# Patient Record
Sex: MALE | Race: WHITE | NOT HISPANIC OR LATINO | Employment: OTHER | URBAN - METROPOLITAN AREA
[De-identification: names, ages, dates, MRNs, and addresses within clinical notes are randomized per-mention and may not be internally consistent; named-entity substitution may affect disease eponyms.]

---

## 2017-12-04 ENCOUNTER — ALLSCRIPTS OFFICE VISIT (OUTPATIENT)
Dept: OTHER | Facility: OTHER | Age: 72
End: 2017-12-04

## 2017-12-04 LAB
BILIRUB UR QL STRIP: NORMAL
CLARITY UR: NORMAL
COLOR UR: YELLOW
GLUCOSE (HISTORICAL): NORMAL
HGB UR QL STRIP.AUTO: NORMAL
KETONES UR STRIP-MCNC: NORMAL MG/DL
LEUKOCYTE ESTERASE UR QL STRIP: NORMAL
NITRITE UR QL STRIP: NORMAL
PH UR STRIP.AUTO: 6 [PH]
PROT UR STRIP-MCNC: NORMAL MG/DL
SP GR UR STRIP.AUTO: 1.02
UROBILINOGEN UR QL STRIP.AUTO: 0.2

## 2017-12-05 DIAGNOSIS — N40.1 ENLARGED PROSTATE WITH LOWER URINARY TRACT SYMPTOMS (LUTS): ICD-10-CM

## 2018-01-22 VITALS
BODY MASS INDEX: 32.87 KG/M2 | DIASTOLIC BLOOD PRESSURE: 80 MMHG | WEIGHT: 248 LBS | HEIGHT: 73 IN | SYSTOLIC BLOOD PRESSURE: 132 MMHG

## 2018-12-19 RX ORDER — WARFARIN SODIUM 5 MG/1
TABLET ORAL
COMMUNITY

## 2018-12-19 RX ORDER — VALSARTAN 80 MG/1
1 TABLET ORAL DAILY
COMMUNITY
Start: 2016-06-21

## 2018-12-19 RX ORDER — AMLODIPINE BESYLATE 5 MG/1
TABLET ORAL
COMMUNITY
Start: 2018-10-10

## 2018-12-19 RX ORDER — METOPROLOL SUCCINATE 25 MG/1
TABLET, EXTENDED RELEASE ORAL
COMMUNITY
Start: 2018-12-10

## 2018-12-19 RX ORDER — PIRFENIDONE 801 MG/1
801 TABLET, FILM COATED ORAL
COMMUNITY
Start: 2018-09-24

## 2018-12-19 RX ORDER — AMOXICILLIN 500 MG/1
CAPSULE ORAL
COMMUNITY
Start: 2017-12-11

## 2018-12-19 RX ORDER — OMEGA-3 FATTY ACIDS CAP DELAYED RELEASE 1000 MG 1000 MG
2 CAPSULE DELAYED RELEASE ORAL DAILY
COMMUNITY

## 2018-12-19 RX ORDER — CLOBETASOL PROPIONATE 0.5 MG/G
OINTMENT TOPICAL
COMMUNITY
Start: 2017-07-25

## 2018-12-19 RX ORDER — FLUTICASONE PROPIONATE 50 MCG
1 SPRAY, SUSPENSION (ML) NASAL
COMMUNITY
Start: 2016-06-21

## 2018-12-19 RX ORDER — ROSUVASTATIN CALCIUM 10 MG/1
10 TABLET, COATED ORAL
COMMUNITY
Start: 2018-11-02 | End: 2020-04-06

## 2018-12-24 ENCOUNTER — OFFICE VISIT (OUTPATIENT)
Dept: UROLOGY | Facility: MEDICAL CENTER | Age: 73
End: 2018-12-24
Payer: MEDICARE

## 2018-12-24 VITALS
HEIGHT: 73 IN | HEART RATE: 90 BPM | DIASTOLIC BLOOD PRESSURE: 80 MMHG | BODY MASS INDEX: 32.47 KG/M2 | SYSTOLIC BLOOD PRESSURE: 140 MMHG | WEIGHT: 245 LBS

## 2018-12-24 DIAGNOSIS — N13.8 BPH WITH OBSTRUCTION/LOWER URINARY TRACT SYMPTOMS: Primary | ICD-10-CM

## 2018-12-24 DIAGNOSIS — N40.1 BPH WITH OBSTRUCTION/LOWER URINARY TRACT SYMPTOMS: Primary | ICD-10-CM

## 2018-12-24 DIAGNOSIS — R31.29 OTHER MICROSCOPIC HEMATURIA: ICD-10-CM

## 2018-12-24 PROBLEM — I45.10 RBBB: Status: ACTIVE | Noted: 2017-10-17

## 2018-12-24 PROBLEM — Z79.01 LONG TERM (CURRENT) USE OF ANTICOAGULANTS: Status: ACTIVE | Noted: 2017-01-27

## 2018-12-24 PROBLEM — R06.02 SOB (SHORTNESS OF BREATH): Status: ACTIVE | Noted: 2017-01-11

## 2018-12-24 LAB
BACTERIA UR QL AUTO: ABNORMAL /HPF
BILIRUB UR QL STRIP: NEGATIVE
CLARITY UR: CLEAR
COLOR UR: ABNORMAL
GLUCOSE UR STRIP-MCNC: NEGATIVE MG/DL
HGB UR QL STRIP.AUTO: ABNORMAL
HYALINE CASTS #/AREA URNS LPF: ABNORMAL /LPF
KETONES UR STRIP-MCNC: NEGATIVE MG/DL
LEUKOCYTE ESTERASE UR QL STRIP: NEGATIVE
NITRITE UR QL STRIP: NEGATIVE
NON-SQ EPI CELLS URNS QL MICRO: ABNORMAL /HPF
PH UR STRIP.AUTO: 5.5 [PH] (ref 4.5–8)
PROT UR STRIP-MCNC: NEGATIVE MG/DL
RBC #/AREA URNS AUTO: ABNORMAL /HPF
SL AMB  POCT GLUCOSE, UA: ABNORMAL
SL AMB LEUKOCYTE ESTERASE,UA: ABNORMAL
SL AMB POCT BILIRUBIN,UA: ABNORMAL
SL AMB POCT BLOOD,UA: ABNORMAL
SL AMB POCT CLARITY,UA: CLEAR
SL AMB POCT COLOR,UA: YELLOW
SL AMB POCT KETONES,UA: ABNORMAL
SL AMB POCT NITRITE,UA: ABNORMAL
SL AMB POCT PH,UA: 6
SL AMB POCT SPECIFIC GRAVITY,UA: 1.02
SL AMB POCT URINE PROTEIN: ABNORMAL
SL AMB POCT UROBILINOGEN: 0.2
SP GR UR STRIP.AUTO: 1.03 (ref 1–1.03)
UROBILINOGEN UR QL STRIP.AUTO: 0.2 E.U./DL
WBC #/AREA URNS AUTO: ABNORMAL /HPF

## 2018-12-24 PROCEDURE — 99214 OFFICE O/P EST MOD 30 MIN: CPT | Performed by: UROLOGY

## 2018-12-24 PROCEDURE — 81003 URINALYSIS AUTO W/O SCOPE: CPT | Performed by: UROLOGY

## 2018-12-24 PROCEDURE — 81001 URINALYSIS AUTO W/SCOPE: CPT | Performed by: UROLOGY

## 2018-12-24 NOTE — ASSESSMENT & PLAN NOTE
AUA symptom score is 1  He is pleased with his voiding pattern  We will continue to follow with watchful waiting  PSA last year was 1 01  The pros and cons of PSA testing were discussed

## 2018-12-24 NOTE — ASSESSMENT & PLAN NOTE
This is chronic and likely related to his use of anticoagulation  His renal function is normal   We will send off urinalysis for microscopic analysis to assess whether this is of any significance

## 2018-12-24 NOTE — PROGRESS NOTES
Assessment/Plan:    BPH with obstruction/lower urinary tract symptoms  AUA symptom score is 1  He is pleased with his voiding pattern  We will continue to follow with watchful waiting  PSA last year was 1 01  The pros and cons of PSA testing were discussed  Other microscopic hematuria  This is chronic and likely related to his use of anticoagulation  We will send off urinalysis for microscopic analysis to assess whether this is of any significance  Diagnoses and all orders for this visit:    BPH with obstruction/lower urinary tract symptoms  -     POCT urine dip auto non-scope    Other microscopic hematuria  -     Urinalysis with microscopic    Other orders  -     amLODIPine (NORVASC) 5 mg tablet; TAKE ONE TABLET BY MOUTH EVERY DAY  -     amoxicillin (AMOXIL) 500 mg capsule; 4 caps one hour  prior to dental procedures  -     mometasone (ASMANEX 120 METERED DOSES) 220 MCG/INH inhaler; Inhale 2 puffs 2 (two) times a day  -     clobetasol (TEMOVATE) 0 05 % ointment; apply prn for flares  -     ipratropium-albuterol (COMBIVENT RESPIMAT) inhaler; Inhale 1 puff 4 (four) times a day  -     warfarin (COUMADIN) 5 mg tablet; Take by mouth  -     Pirfenidone (ESBRIET) 267 MG CAPS; Take 3 capsules by mouth daily  -     Omega-3 Fatty Acids (FISH OIL) 1000 MG CPDR; Take 2 capsules by mouth daily  -     fluticasone (FLONASE) 50 mcg/act nasal spray; 1 spray into each nostril  -     metoprolol succinate (TOPROL-XL) 25 mg 24 hr tablet; TAKE ONE TABLET BY MOUTH EVERY DAY  -     OMEGA-3 FATTY ACIDS-VITAMIN E PO; Take two tablets daily 2400mg  -     Pirfenidone 801 MG TABS; Take 801 mg by mouth  -     rosuvastatin (CRESTOR) 10 MG tablet; Take 10 mg by mouth  -     valsartan (DIOVAN) 80 mg tablet; Take 1 tablet by mouth daily  -     Cetirizine HCl (ZYRTEC ALLERGY) 10 MG CAPS; Take 1 tablet by mouth          Subjective:      Patient ID: Reed Barrera  is a 74 y o  male      Benign Prostatic Hypertrophy   This is a chronic problem  The current episode started more than 1 year ago  The problem is unchanged  Irritative symptoms do not include frequency, nocturia or urgency  Obstructive symptoms do not include dribbling, incomplete emptying, an intermittent stream, a slower stream, straining or a weak stream  Pertinent negatives include no chills, dysuria, genital pain, hematuria, hesitancy, nausea or vomiting  AUA score is 0-7  He is not sexually active  Nothing aggravates the symptoms  Past treatments include nothing  The following portions of the patient's history were reviewed and updated as appropriate: allergies, current medications, past family history, past medical history, past social history, past surgical history and problem list     Review of Systems   Constitutional: Negative for chills, diaphoresis, fatigue and fever  HENT: Negative  Eyes: Negative  Respiratory: Positive for cough and shortness of breath  Cardiovascular: Negative  Gastrointestinal: Negative  Negative for nausea and vomiting  Endocrine: Negative  Genitourinary: Negative for dysuria, frequency, hematuria, hesitancy, incomplete emptying, nocturia and urgency  Musculoskeletal: Negative  Skin: Negative  Allergic/Immunologic: Negative  Neurological: Negative  Hematological: Negative  Psychiatric/Behavioral: Negative  AUA SYMPTOM SCORE      Most Recent Value   AUA SYMPTOM SCORE   How often have you had a sensation of not emptying your bladder completely after you finished urinating? 0   How often have you had to urinate again less than two hours after you finished urinating? 0   How often have you found you stopped and started again several times when you urinate?  0   How often have you found it difficult to postpone urination? 0   How often have you had a weak urinary stream?  0   How often have you had to push or strain to begin urination?   0   How many times did you most typically get up to urinate from the time you went to bed at night until the time you got up in the morning? 1   Quality of Life: If you were to spend the rest of your life with your urinary condition just the way it is now, how would you feel about that?  1   AUA SYMPTOM SCORE  1        Objective:      /80 (BP Location: Left arm, Patient Position: Sitting, Cuff Size: Adult)   Pulse 90   Ht 6' 1" (1 854 m)   Wt 111 kg (245 lb)   BMI 32 32 kg/m²          Physical Exam   Constitutional: He is oriented to person, place, and time  He appears well-developed and well-nourished  HENT:   Head: Normocephalic and atraumatic  Eyes: Conjunctivae are normal    Neck: Neck supple  Cardiovascular: Normal rate  Pulmonary/Chest: Effort normal    Abdominal: Soft  Bowel sounds are normal  He exhibits no distension and no mass  There is no tenderness  There is no rebound, no guarding and no CVA tenderness  No hernia   Genitourinary: Rectum normal, testes normal and penis normal  Right testis shows no mass  Left testis shows no mass  No phimosis or hypospadias  Genitourinary Comments: Prostate 1+ enlarged and palpably benign  Musculoskeletal: Normal range of motion  He exhibits no edema  Neurological: He is alert and oriented to person, place, and time  Skin: Skin is warm and dry  Psychiatric: He has a normal mood and affect   His behavior is normal  Judgment and thought content normal

## 2018-12-24 NOTE — PATIENT INSTRUCTIONS
Benign Prostatic Hypertrophy   WHAT YOU NEED TO KNOW:   Benign prostatic hypertrophy (BPH) is a condition that causes your prostate gland to grow larger than normal  The prostate gland is the male sex gland that produces a fluid that is part of semen  It is about the size of a walnut and it is located under the bladder  As the prostate grows, it can squeeze the urethra  This can block urine flow and cause urinary problems  DISCHARGE INSTRUCTIONS:   Medicines:   · Alpha blockers: This medicine relaxes the muscles in your prostate and bladder  It may help you urinate more easily  · 5 alpha reductase inhibitors: These medicines block the production of a hormone that causes the prostate to get larger  It may help slow the growth of the prostate or shrink the prostate  · Take your medicine as directed  Contact your healthcare provider if you think your medicine is not helping or if you have side effects  Tell him or her if you are allergic to any medicine  Keep a list of the medicines, vitamins, and herbs you take  Include the amounts, and when and why you take them  Bring the list or the pill bottles to follow-up visits  Carry your medicine list with you in case of an emergency  Follow up with your healthcare provider as directed:  Write down your questions so you remember to ask them during your visits  Manage BPH:   · Do not let your bladder get too full before you empty it  Urinate when you feel the urge  · Limit alcohol  Do not drink large amounts of any liquid at one time  · Decrease the amount of salt you eat  Examples of salty foods are chips, cured meats, and canned soups  Do not use table salt  · Healthcare providers may tell you not to eat spicy foods such as chilli peppers  This may help you find out if spicy food makes your BPH symptoms worse  · You may have sex if you feel well  Contact your healthcare provider if:   · There is a large amount of blood in your urine  · Your signs and symptoms get worse  · You have a fever  · You have questions or concerns about your condition or care  Seek care immediately if:   · You are unable to urinate  · Your bladder feels very full and painful  © 2017 [employer] Information is for End User's use only and may not be sold, redistributed or otherwise used for commercial purposes  All illustrations and images included in CareNotes® are the copyrighted property of [employer]  or Chap[employer] Singh  The above information is an  only  It is not intended as medical advice for individual conditions or treatments  Talk to your doctor, nurse or pharmacist before following any medical regimen to see if it is safe and effective for you

## 2018-12-24 NOTE — LETTER
December 24, 2018     [address] [id number]    Patient: Samantha Navarrete  YOB: 1945   Date of Visit: 12/24/2018       Dear Dr Nae White: Thank you for referring Nick Lainez to me for evaluation  Below are my notes for this consultation  If you have questions, please do not hesitate to call me  I look forward to following your patient along with you  Sincerely,        Trinity Major MD        CC: No Recipients  Trinity Major MD  12/24/2018  9:07 AM  Sign at close encounter  Assessment/Plan:    BPH with obstruction/lower urinary tract symptoms  AUA symptom score is 1  He is pleased with his voiding pattern  We will continue to follow with watchful waiting  PSA last year was 1 01  The pros and cons of PSA testing were discussed  Other microscopic hematuria  This is chronic and likely related to his use of anticoagulation  We will send off urinalysis for microscopic analysis to assess whether this is of any significance  Diagnoses and all orders for this visit:    BPH with obstruction/lower urinary tract symptoms  -     POCT urine dip auto non-scope    Other microscopic hematuria  -     Urinalysis with microscopic    Other orders  -     amLODIPine (NORVASC) 5 mg tablet; TAKE ONE TABLET BY MOUTH EVERY DAY  -     amoxicillin (AMOXIL) 500 mg capsule; 4 caps one hour  prior to dental procedures  -     mometasone (ASMANEX 120 METERED DOSES) 220 MCG/INH inhaler; Inhale 2 puffs 2 (two) times a day  -     clobetasol (TEMOVATE) 0 05 % ointment; apply prn for flares  -     ipratropium-albuterol (COMBIVENT RESPIMAT) inhaler; Inhale 1 puff 4 (four) times a day  -     warfarin (COUMADIN) 5 mg tablet; Take by mouth  -     Pirfenidone (ESBRIET) 267 MG CAPS; Take 3 capsules by mouth daily  -     Omega-3 Fatty Acids (FISH OIL) 1000 MG CPDR;  Take 2 capsules by mouth daily  -     fluticasone (FLONASE) 50 mcg/act nasal spray; 1 spray into each nostril  -     metoprolol succinate (TOPROL-XL) 25 mg 24 hr tablet; TAKE ONE TABLET BY MOUTH EVERY DAY  -     OMEGA-3 FATTY ACIDS-VITAMIN E PO; Take two tablets daily 2400mg  -     Pirfenidone 801 MG TABS; Take 801 mg by mouth  -     rosuvastatin (CRESTOR) 10 MG tablet; Take 10 mg by mouth  -     valsartan (DIOVAN) 80 mg tablet; Take 1 tablet by mouth daily  -     Cetirizine HCl (ZYRTEC ALLERGY) 10 MG CAPS; Take 1 tablet by mouth          Subjective:      Patient ID: Bella Briones  is a 74 y o  male  Benign Prostatic Hypertrophy   This is a chronic problem  The current episode started more than 1 year ago  The problem is unchanged  Irritative symptoms do not include frequency, nocturia or urgency  Obstructive symptoms do not include dribbling, incomplete emptying, an intermittent stream, a slower stream, straining or a weak stream  Pertinent negatives include no chills, dysuria, genital pain, hematuria, hesitancy, nausea or vomiting  AUA score is 0-7  He is not sexually active  Nothing aggravates the symptoms  Past treatments include nothing  The following portions of the patient's history were reviewed and updated as appropriate: allergies, current medications, past family history, past medical history, past social history, past surgical history and problem list     Review of Systems   Constitutional: Negative for chills, diaphoresis, fatigue and fever  HENT: Negative  Eyes: Negative  Respiratory: Positive for cough and shortness of breath  Cardiovascular: Negative  Gastrointestinal: Negative  Negative for nausea and vomiting  Endocrine: Negative  Genitourinary: Negative for dysuria, frequency, hematuria, hesitancy, incomplete emptying, nocturia and urgency  Musculoskeletal: Negative  Skin: Negative  Allergic/Immunologic: Negative  Neurological: Negative  Hematological: Negative  Psychiatric/Behavioral: Negative          AUA SYMPTOM SCORE      Most Recent Value AUA SYMPTOM SCORE   How often have you had a sensation of not emptying your bladder completely after you finished urinating? 0   How often have you had to urinate again less than two hours after you finished urinating? 0   How often have you found you stopped and started again several times when you urinate?  0   How often have you found it difficult to postpone urination? 0   How often have you had a weak urinary stream?  0   How often have you had to push or strain to begin urination? 0   How many times did you most typically get up to urinate from the time you went to bed at night until the time you got up in the morning? 1   Quality of Life: If you were to spend the rest of your life with your urinary condition just the way it is now, how would you feel about that?  1   AUA SYMPTOM SCORE  1        Objective:      /80 (BP Location: Left arm, Patient Position: Sitting, Cuff Size: Adult)   Pulse 90   Ht 6' 1" (1 854 m)   Wt 111 kg (245 lb)   BMI 32 32 kg/m²           Physical Exam   Constitutional: He is oriented to person, place, and time  He appears well-developed and well-nourished  HENT:   Head: Normocephalic and atraumatic  Eyes: Conjunctivae are normal    Neck: Neck supple  Cardiovascular: Normal rate  Pulmonary/Chest: Effort normal    Abdominal: Soft  Bowel sounds are normal  He exhibits no distension and no mass  There is no tenderness  There is no rebound, no guarding and no CVA tenderness  No hernia   Genitourinary: Rectum normal, testes normal and penis normal  Right testis shows no mass  Left testis shows no mass  No phimosis or hypospadias  Genitourinary Comments: Prostate 1+ enlarged and palpably benign  Musculoskeletal: Normal range of motion  He exhibits no edema  Neurological: He is alert and oriented to person, place, and time  Skin: Skin is warm and dry  Psychiatric: He has a normal mood and affect   His behavior is normal  Judgment and thought content normal

## 2019-01-04 DIAGNOSIS — R31.29 OTHER MICROSCOPIC HEMATURIA: Primary | ICD-10-CM

## 2019-01-07 ENCOUNTER — TELEPHONE (OUTPATIENT)
Dept: UROLOGY | Facility: MEDICAL CENTER | Age: 74
End: 2019-01-07

## 2019-01-07 NOTE — TELEPHONE ENCOUNTER
----- Message from Trinity Major MD sent at 1/4/2019  5:02 PM EST -----  Please call the patient regarding his abnormal result  The amount of blood in his urine was not clinically significant  I would like to recheck a urinalysis with microscopic in 3 months

## 2019-01-07 NOTE — TELEPHONE ENCOUNTER
LMOM for pt to call back for urine with micro results  Pt to have another one done in 3 months script is in [facility]

## 2019-12-10 ENCOUNTER — TELEPHONE (OUTPATIENT)
Dept: OTHER | Facility: OTHER | Age: 74
End: 2019-12-10

## 2019-12-11 NOTE — TELEPHONE ENCOUNTER
[employer] calling from [facility]/ [phone] Ext [phone]/ PT: Adelina Fonseca : [phone]/ New admission/ [facility] Text PT'S information to Dr South Verma @193/ Advised caller to call back in 20-30 minutes in the Dr Kira Hall contact her back

## 2019-12-12 ENCOUNTER — NURSING HOME VISIT (OUTPATIENT)
Dept: GERIATRICS | Facility: OTHER | Age: 74
End: 2019-12-12
Payer: MEDICARE

## 2019-12-12 DIAGNOSIS — Z79.01 LONG TERM (CURRENT) USE OF ANTICOAGULANTS: ICD-10-CM

## 2019-12-12 DIAGNOSIS — E78.00 PURE HYPERCHOLESTEROLEMIA: ICD-10-CM

## 2019-12-12 DIAGNOSIS — J84.112 IPF (IDIOPATHIC PULMONARY FIBROSIS) (HCC): ICD-10-CM

## 2019-12-12 DIAGNOSIS — I48.0 PAF (PAROXYSMAL ATRIAL FIBRILLATION) (HCC): ICD-10-CM

## 2019-12-12 DIAGNOSIS — J96.21 ACUTE ON CHRONIC RESPIRATORY FAILURE WITH HYPOXIA (HCC): ICD-10-CM

## 2019-12-12 DIAGNOSIS — R53.81 PHYSICAL DECONDITIONING: ICD-10-CM

## 2019-12-12 DIAGNOSIS — Z95.4 S/P AORTIC VALVE REPLACEMENT WITH METALLIC VALVE: ICD-10-CM

## 2019-12-12 DIAGNOSIS — J14 PNEUMONIA OF BOTH LOWER LOBES DUE TO HAEMOPHILUS INFLUENZAE (HCC): Primary | ICD-10-CM

## 2019-12-12 DIAGNOSIS — N17.9 AKI (ACUTE KIDNEY INJURY) (HCC): ICD-10-CM

## 2019-12-12 DIAGNOSIS — K21.00 GASTRO-ESOPHAGEAL REFLUX DISEASE WITH ESOPHAGITIS: ICD-10-CM

## 2019-12-12 PROCEDURE — 99306 1ST NF CARE HIGH MDM 50: CPT | Performed by: FAMILY MEDICINE

## 2019-12-12 NOTE — PROGRESS NOTES
[facility]   [address] [place] [address], [place], [address]  History and Physical  POS: SNF- 31    Records Reviewed include: [facility] records  Unable to obtain from patient due to: N/A; history obtained from patient along with record review    Chief Complaint/ Reason for Admission: Acute on chronic respiratory failure with hypoxia, S/p mechanical AVR, PAfib/Aflutter, idiopathic pulmonary fibrosis, bilateral lower lobe pneumonia 2/2 H  Flu, deconditioning, PARDEEP    History of Present Illness:            66year old male admitted for SNF rehab following hospitalization at [facility]; presented with cough, malaise, SOB; noted to have acute on chronic respiratory failure with hypoxia (on 4L NC at baseline with idiopathic pulmonary fibrosis) as well as bilateral pneumonia with sputum cultures positive for H influenza  He was treated with antibiotic therapy; initially required ICU care for respiratory support (high flow)  Was discharged on prolonged, slow prednisone taper (see med list below)  He continues with cough productive of yellow sputum; improved overall  Associated leukocytosis; downtrending; no fever or chills  Also with noted PARDEEP, improved with treatment of above; baseline creatinine 0 9-1 per record review  Continues on 4L continuous NC during the day but is requiring 5L QHS per patient  Patient with chronic mechanical aortic valve; noted to have supratherapeutic INR inpatient; he was managed with heparin drip and bridged back to coumadin; per discharge medication list, got 1mg coumadin on 12/10 and was ordered 1mg of coumadin on 12/11; follow up PT/INR today was low at 1 3  As an outpatient, per patient, he was on coumadin 15mg 3x/week and 10mg 4x/week  Has intolerance to statins 2/2 myalgias; was discharged on rosuvastatin        Allergies    Allergies   Allergen Reactions    Atorvastatin      Other reaction(s): achey    Niacin Hives    Pravastatin Other (See Comments)     Other reaction(s): Free Text    Rosuvastatin Other (See Comments)     Other reaction(s): Free Text    Statins Myalgia       Past Medical History  Past Medical History:   Diagnosis Date    BPH with obstruction/lower urinary tract symptoms     Hypertension       CHF baseline EF: 55-60% (11/2017)  CKD: Stage 2, baseline creatinine 0 9-1    Past Surgical History:   Procedure Laterality Date    CARDIAC SURGERY      COLONOSCOPY         Family History  Family History   Problem Relation Age of Onset    Heart disease Mother        Social History  Social History     Tobacco Use   Smoking Status Never Smoker   Smokeless Tobacco Never Used      Social History     Substance and Sexual Activity   Alcohol Use Not on file      Social History     Substance and Sexual Activity   Drug Use Not on file        Lives: Home, with spouse,  Social Support: Family  Fall in the past 12 months: None  Use of assistance Device: None    Physical Exam    Weight: 234lb (239 4 on 12/10) Temp:95 9F (98 1) BP:134/63 Pulse:61 (-72) Resp:18 O2 Sat:98% 4LNC  Constitutional: Well-nourished, Normocephalic and Pallor  Orientation:Person, Place and Day, situation     Physical Exam   Constitutional: He is oriented to person, place, and time  He appears well-developed and well-nourished  He is active and cooperative  He appears ill (chronically ill appearing)  No distress  Nasal cannula in place  HENT:   Head: Normocephalic and atraumatic  Mouth/Throat: Oropharynx is clear and moist  No oropharyngeal exudate  Eyes: Conjunctivae are normal  Right eye exhibits no discharge  Left eye exhibits no discharge  No scleral icterus  Neck: Neck supple  Cardiovascular: Normal rate and regular rhythm  Murmur (mechanical heart sound) heard  Pulmonary/Chest: Effort normal  No accessory muscle usage  He has decreased breath sounds in the right middle field, the right lower field, the left middle field and the left lower field  He has no wheezes   He has no rhonchi  He has no rales  Velcro crackles b/l bases   Abdominal: Soft  Bowel sounds are normal    Musculoskeletal: He exhibits no edema  Neurological: He is alert and oriented to person, place, and time  No cranial nerve deficit  Skin: Skin is warm and dry  He is not diaphoretic  There is pallor  Psychiatric: He has a normal mood and affect  Nursing note and vitals reviewed  Review of Systems:  Review of Systems   Constitutional: Positive for activity change and fatigue  Negative for appetite change, chills and fever  HENT: Negative for congestion  Respiratory: Positive for cough  Negative for chest tightness and shortness of breath  Cardiovascular: Negative for chest pain, palpitations and leg swelling  Gastrointestinal: Negative for abdominal pain  Genitourinary: Negative for difficulty urinating  Musculoskeletal: Negative for arthralgias and myalgias  Neurological: Negative for dizziness and light-headedness  Psychiatric/Behavioral: Negative for sleep disturbance  All other systems reviewed and are negative        List of Current Medications:   PRN: albuterol neb, benzonatate, bisacodyl, clobetasol topical, acetaminophen, MoM, dulcolax, fleet enema, maalox    Fish oil 1000mg- 2 capsules daily  Mucinex ER 600mg- 2 tablets BID  Melatonin 5mg QHS  Metoprolol tartrate 25mg BID  Pantoprazole 40mg BID  Esbriet 801mg TID  Prednisone 50mg 12/11, 12/12; then 45mg x 7 days, then 40mg x7 days, then 35mg x7 days, then 30mg x7 days, then 25mg x7days, then 20mg x7days, then 15mg x7 days, then 10mg daily  Rosuvastatin 10mg daily  Senna-Docusate 8 6mg-50mg, 2 tabs BID    Daily weight    Allergies    Allergies   Allergen Reactions    Atorvastatin      Other reaction(s): achey    Niacin Hives    Pravastatin Other (See Comments)     Other reaction(s): Free Text    Rosuvastatin Other (See Comments)     Other reaction(s): Free Text    Statins Myalgia       Labs/Diagnostics (reviewed by this provider): Hospital Paperwork  CBC: Hb:12 4 Hct:38 3 WBC:12 8 (16 8) PLT:334  CMP: Na:138 K:4 7 Cl:100 CO:40 BUN:19 Cr:0 85 (1 82) Glu:90 Ca:8 5 AST:49 ALT:84 Alk-P:46 Tprotein:6 6 Albumin:2 3 Tbili:0 4 Ma 1 Phos:3 7  Cardiac: Troponin:0 21 (0 30) AB  Other:   INR (12/10/19): 2 8    Microbiology:  Urine Cx: no growth  Blood Cx: no growth  Urine strep/legionella: negative  Sputum Cx: Haemophilus influenza  Viral respiratory panel: negative    Imaging Reviewed:  EKG: (19)- sinus rhythm with PACs; RBBB, LAFB  CXR: (19)- minimal bilateral alveolar infiltrates; (12/3/19)- bilateral interstitial prominence; small b/l pleural effusions  Echo: (19)- EF 55%  Other: RUE venous duplex (19)- negative for DVT    Assessment/Plan:  66year old male with:    Pneumonia of both lower lobes due to Haemophilus influenzae ([facility] [address] )  Completed antibiotic course inpatient  Continue continuous oxygen at 4L NC; goal >90%  Continue Yankauer for self suctioning as needed  Continue mucinex, PRN nebs  CBC w/diff, BMP ordered for     Acute on chronic respiratory failure with hypoxia ([facility] [address] )  In setting of acute bilateral pneumonia with underlying pulmonary fibrosis- see management of both as outlined  4L NC continuous at baseline    S/P aortic valve replacement with metallic valve  Goal INR 2 5  With subtherapeutic INR- see management of coumadin as outlined    Long term (current) use of anticoagulants  Goal INR 2 5 in setting of mechanical aortic valve  INR low at 1 3 today  Order coumadin 10mg   Recheck PT/INR on   Order lovenox 150mg SQ once on , then 100mg SQ BID until INR>2    PARDEEP (acute kidney injury) ([facility] [address] )  In setting of acute illness, pneumonia  Baseline creatinine 0 9-1  PARDEEP pathway/daily weights  Avoid hypotension  BMP ordered for     IPF (idiopathic pulmonary fibrosis) ([facility])  Continue continuous oxygen via nasal cannula  Prolonged prednisone taper as outlined in med list above  Continue Esbriet TID with food  Has pulmonary follow up scheduled for February 2020    PAF (paroxysmal atrial fibrillation) (HCC)  NSR at time of admission exam  Continue metoprolol for rate control, goal HR<100  Coumadin management as outlined    Gastro-esophageal reflux disease with esophagitis  Continue pantoprazole    Physical deconditioning  Multifactorial  Admit to SNF for rehab  PT/OT consult- evaluate and treat  Supportive care, nutritional support, ADL support  Fall precautions    Pure hypercholesterolemia  Continue fish oil; d/c rosuvastatin as patient with history of statin intolerance      Pain: Present no  Rehab Potential:Fair  Patient Informed of Medical Condition: yes  Patient is Capable of Understanding Their Right: yes  Prognosis:Fair  Discharge Plan: STR-> Home  Surrogate Decision Maker: Wife  Advanced Directives: yes: Yes   Code status:Full Code  PCP: Renay Tello DO    Immunization History   Administered Date(s) Administered     Influenza (IM) Preservative Free 10/19/2015    INFLUENZA 12/08/2004, 10/24/2005, 11/09/2012, 11/05/2013, 10/31/2014, 10/19/2015, 10/20/2016, 10/30/2017, 10/19/2018    Influenza Split High Dose Preservative Free IM 10/19/2018    Influenza TIV (IM) 1945    Pneumococcal Conjugate 13-Valent 04/18/2016    Pneumococcal Polysaccharide PPV23 04/01/2009    Zoster 07/16/2012, 03/04/2019       Rissa Ramsey DO  12/12/19

## 2019-12-16 ENCOUNTER — NURSING HOME VISIT (OUTPATIENT)
Dept: GERIATRICS | Facility: OTHER | Age: 74
End: 2019-12-16
Payer: MEDICARE

## 2019-12-16 DIAGNOSIS — J84.112 IPF (IDIOPATHIC PULMONARY FIBROSIS) (HCC): ICD-10-CM

## 2019-12-16 DIAGNOSIS — R26.2 AMBULATORY DYSFUNCTION: ICD-10-CM

## 2019-12-16 DIAGNOSIS — N17.9 AKI (ACUTE KIDNEY INJURY) (HCC): ICD-10-CM

## 2019-12-16 DIAGNOSIS — R53.81 PHYSICAL DECONDITIONING: ICD-10-CM

## 2019-12-16 DIAGNOSIS — Z95.4 S/P AORTIC VALVE REPLACEMENT WITH METALLIC VALVE: ICD-10-CM

## 2019-12-16 DIAGNOSIS — Z79.01 LONG TERM (CURRENT) USE OF ANTICOAGULANTS: ICD-10-CM

## 2019-12-16 DIAGNOSIS — J14 PNEUMONIA OF BOTH LOWER LOBES DUE TO HAEMOPHILUS INFLUENZAE (HCC): Primary | ICD-10-CM

## 2019-12-16 PROCEDURE — 99309 SBSQ NF CARE MODERATE MDM 30: CPT | Performed by: NURSE PRACTITIONER

## 2019-12-16 NOTE — PROGRESS NOTES
[facility]   [address] [place] [address], [place], [address]  STR Note    Chief Complaint/Reason for visit: STR follow up-pneumonia both lower lobes due to Haemophilus influenzae; acute on chronic respiratory failure with hypoxia; status post aortic valve replacement with metallic valve; long-term use of anticoagulant; acute kidney injury; idiopathic pulmonary fibrosis; physical deconditioning; ambulatory dysfunction  History of Present Illness: 75-year-old male seen and examined for follow-up care  Patient was sitting out of bed in chair in room with oxygen on  He appeared in no distress  Patient stated that he feels better and participating in therapy without any setbacks  No episodes of respiratory distress reported from patient or from nursing  Appetite is good and patient is sleeping well  Denies shortness of breath at rest, chest pain, headache, dizziness, lightheadedness, abdominal pain, nausea, vomiting, diarrhea, or constipation  Past Medical History: unchanged from history and physical  Past Medical History:   Diagnosis Date    BPH with obstruction/lower urinary tract symptoms     Hypertension      Family History: unchanged from history and physical  Social History: unchanged from history and physical  Resident Since:  12/10/2019  Review of systems: Review of Systems   Constitutional: Negative for appetite change, chills and diaphoresis  Fatigued easily  HENT: Negative  Eyes: Negative  Respiratory: Positive for cough  Shortness of breath on exertion  Gastrointestinal: Negative  Endocrine: Negative  Genitourinary: Negative  Musculoskeletal: Positive for gait problem  Skin: Negative  Neurological: Negative for dizziness, syncope, light-headedness and headaches  Psychiatric/Behavioral: Negative  All other systems reviewed and are negative  Medications: All medication orders were reviewed at facility EMR    Changes made- see written orders  Allergies: Reviewed and unchanged  Consults reviewed:PT and OT  Labs/Diagnostics (reviewed by this provider): Copy in Chart    Imaging Reviewed:  None today    Physical Exam    Weight:  230 4 lb Temp:  97 1 BP:  143/76 Pulse:  78 Resp:  16 O2 Sat:  94% on oxygen  Constitutional: Well-nourished and Normocephalic  Orientation:Person, Place, Day, Date, Month and Year     Physical Exam   Constitutional: He is oriented to person, place, and time  He appears well-developed and well-nourished  No distress  Elderly male sitting out of bed in chair and appears with chronic illness  He appears his stated age  HENT:   Head: Normocephalic and atraumatic  Eyes: Pupils are equal, round, and reactive to light  EOM are normal  Right eye exhibits no discharge  Left eye exhibits no discharge  No scleral icterus  Neck: Normal range of motion  Neck supple  No JVD present  No tracheal deviation present  Cardiovascular: Normal rate and regular rhythm  Mechanical heart valve   Pulmonary/Chest: Breath sounds normal  He has no wheezes  He has no rales  Course rales bibasilar with decreased breath sounds bilaterally  Dyspneic on exertion  Pursed lip breathing noted at times after speaking  Abdominal: Soft  Bowel sounds are normal  He exhibits no distension  There is no tenderness  There is no guarding  Musculoskeletal: He exhibits no edema  Able to move all extremities  Lymphadenopathy:     He has no cervical adenopathy  Neurological: He is alert and oriented to person, place, and time  Skin: Skin is warm and dry  He is not diaphoretic  No cyanosis  Psychiatric: He has a normal mood and affect  His behavior is normal  Thought content normal    Nursing note and vitals reviewed      Assessment/Plan:  77-year-old male with:    Pneumonia of both lower lobes due to Haemophilus influenzae  -completed antibiotic course inpatient  -no reported episodes of respiratory distress  -afebrile and hemodynamically stable  -continues with oxygen use  -states cough is now infrequent   -continue prednisone wean dose  -continue nebulizer treatments p r n   -continue benzonatate 100 mg PO capsules p r n  For cough and Mucinex ER 1200 mg p o  B i d   -CBC reviewed on 12/12/2019, stable    Acute on chronic respiratory failure with hypoxia  -in setting of above an underlying pulmonary fibrosis  -continue oxygen to maintain O2 oxygen saturation > 90%    Status post aortic valve replacement with metallic valve  -PT/INR is subtherapeutic  -continue Lovenox subcu q 12 hours until INR >2 0  -continue coumadin management    Long-term use of anticoagulants  -order Coumadin 11 mg p o  Today then Coumadin 10 5 mg p o  Daily  -recheck PT/INR on 12/20/2019    PARDEEP  -in setting of above  -baseline creatinine level 0 9-1 0  -continue daily weights  -avoid nephrotoxic medications  -prevent hypotension  -ensure adequate hydration  -BMP reviewed from 12/12/2019, stable  -recheck BMP on 12/20/2019    IPF (idiopathic pulmonary fibrosis)  -continuous oxygen via nasal cannula  -continue esbriet 801 mg p o  T i d  With food  -follow up with Pulmonary as scheduled in February of 2020    Physical deconditioning/ambulatory dysfunction  -continue care and support at SNF for ADLs  -continue PT/OT  -fall precautions  -provide nutritional support    ** please note: This progress note was constructed using a voice recognition system  [address] [place], [address] [facility]  [id number] PM

## 2019-12-16 NOTE — ASSESSMENT & PLAN NOTE
NSR at time of admission exam  Continue metoprolol for rate control, goal HR<100  Coumadin management as outlined

## 2019-12-16 NOTE — ASSESSMENT & PLAN NOTE
In setting of acute bilateral pneumonia with underlying pulmonary fibrosis- see management of both as outlined  4L NC continuous at baseline

## 2019-12-16 NOTE — ASSESSMENT & PLAN NOTE
In setting of acute illness, pneumonia  Baseline creatinine 0 9-1  PARDEEP pathway/daily weights  Avoid hypotension  BMP ordered for 12/12

## 2019-12-16 NOTE — ASSESSMENT & PLAN NOTE
Goal INR 2 5 in setting of mechanical aortic valve  INR low at 1 3 today  Order coumadin 10mg 12/12  Recheck PT/INR on 12/13  Order lovenox 150mg SQ once on 12/12, then 100mg SQ BID until INR>2

## 2019-12-16 NOTE — ASSESSMENT & PLAN NOTE
Continue continuous oxygen via nasal cannula  Prolonged prednisone taper as outlined in med list above  Continue Esbriet TID with food  Has pulmonary follow up scheduled for February 2020

## 2019-12-16 NOTE — ASSESSMENT & PLAN NOTE
Completed antibiotic course inpatient  Continue continuous oxygen at 4L NC; goal >90%  Continue Yankauer for self suctioning as needed  Continue mucinex, PRN nebs  CBC w/diff, BMP ordered for 12/12

## 2019-12-20 ENCOUNTER — NURSING HOME VISIT (OUTPATIENT)
Dept: GERIATRICS | Facility: OTHER | Age: 74
End: 2019-12-20
Payer: MEDICARE

## 2019-12-20 DIAGNOSIS — N17.9 AKI (ACUTE KIDNEY INJURY) (HCC): ICD-10-CM

## 2019-12-20 DIAGNOSIS — J84.112 IPF (IDIOPATHIC PULMONARY FIBROSIS) (HCC): ICD-10-CM

## 2019-12-20 DIAGNOSIS — I48.0 PAF (PAROXYSMAL ATRIAL FIBRILLATION) (HCC): ICD-10-CM

## 2019-12-20 DIAGNOSIS — R53.81 PHYSICAL DECONDITIONING: ICD-10-CM

## 2019-12-20 DIAGNOSIS — J96.21 ACUTE ON CHRONIC RESPIRATORY FAILURE WITH HYPOXIA (HCC): ICD-10-CM

## 2019-12-20 DIAGNOSIS — Z95.4 S/P AORTIC VALVE REPLACEMENT WITH METALLIC VALVE: ICD-10-CM

## 2019-12-20 DIAGNOSIS — R26.2 AMBULATORY DYSFUNCTION: ICD-10-CM

## 2019-12-20 DIAGNOSIS — Z79.01 LONG TERM (CURRENT) USE OF ANTICOAGULANTS: ICD-10-CM

## 2019-12-20 DIAGNOSIS — J14 PNEUMONIA OF BOTH LOWER LOBES DUE TO HAEMOPHILUS INFLUENZAE (HCC): Primary | ICD-10-CM

## 2019-12-20 PROCEDURE — 99316 NF DSCHRG MGMT 30 MIN+: CPT | Performed by: NURSE PRACTITIONER

## 2019-12-20 NOTE — PROGRESS NOTES
[address] [facility]  [address]  [phone]  [address] [place] [address] St:  [facility]  [address]    NAME: Mukund Zhang  AGE: 66 y o  SEX: male  DATE OF ADMISSION:  12/10/2019    DATE OF DISCHARGE:  12/22/2019   DISCHARGE DISPOSITION:  Home    Reason for admission: Patient was admitted from [facility] for rehabilitation after hospitalization for acute on chronic respiratory failure with hypoxia, bilateral lower lobe pneumonia due to Haemophilus influenzae, and acute kidney injury  Admission Diagnoses:  Acute on chronic respiratory failure with hypoxia, status post mechanical AVR, paroxysmal atrial fibrillation/atrial flutter, 80 a patch thick pulmonary fibrosis, bilateral lower lobe pneumonia due to Haemophilus influenzae, deconditioning, and acute kidney injury  Additional Problems:   Past Medical History:   Diagnosis Date    BPH with obstruction/lower urinary tract symptoms     Hypertension      Discharge Diagnoses:  See problem list follow-up recommendations below  Course of stay: Patient was admitted to [facility] for rehabilitation following hospitalization at [facility]  During the resident's stay at [facility], he received skilled nursing care, OT, PT, nutritional support, social service support, and medical management for an overall improvement in his functional status  He is scheduled to be discharged home on Sunday, 12/22/2019  A referral to [facility] was placed by social service  Labs and testing performed during stay:  PT/INR, CBC with diff, BMP on 12/20/2019  Hemoglobin 11 7  Hematocrit 36 1  WBC 6 4 platelet count 151  Discharge Medications: See discharge medication list which was reviewed at facility EMR  Status at time of discharge exam: Stable    Today's Visit: 12/20/20192:52 PM    Subjective:  Positive for activity change in fatigue post hospitalization  Negative for appetite change, chills and fever  Positive for postnasal drip and cough  Negative for shortness of breath at rest or chest tightness  Negative for chest pain or palpitations  Negative for abdominal pain, nausea, vomiting, diarrhea, or constipation  Negative for dizziness or lightheadedness  Vitals:  Weight:  231 8 lb   BP:  103/52   TPR:  97 1/62/18   pulse ox:  98% with oxygen via nasal cannula    Exam: Physical Exam   Constitutional: He is oriented to person, place, and time  He appears well-developed  No distress  HENT:   Head: Normocephalic and atraumatic  Mouth/Throat: Oropharynx is clear and moist  No oropharyngeal exudate  Eyes: Pupils are equal, round, and reactive to light  Conjunctivae and EOM are normal    Neck: Normal range of motion  Neck supple  No JVD present  No tracheal deviation present  Cardiovascular: Normal rate, regular rhythm and normal heart sounds  Mechanical valve audible on auscultation  Pulmonary/Chest:   Coarse rhonchi bilateral upper lung fields with decreased breath sounds lower lung fields  Cough productive for clear mucus  Dyspneic on exertion  Abdominal: Soft  Bowel sounds are normal  He exhibits no distension  There is no tenderness  There is no guarding  Musculoskeletal: He exhibits no edema  Able to move all extremities  Lymphadenopathy:     He has no cervical adenopathy  Neurological: He is alert and oriented to person, place, and time  Skin: Skin is warm and dry  He is not diaphoretic  Psychiatric: He has a normal mood and affect  His behavior is normal  Thought content normal    Nursing note and vitals reviewed      Discussion with patient/family and further instructions:  -Lovenox and Coumadin  -PT/INR levels  -Fall precautions  -Bleeding precautions  -Monitor for signs/symptoms of infection  -Medication list was reviewed at facility EMR    Follow-up Recommendations: Please follow-up with your primary care physician within 7-10 days of discharge to review medication changes and current status  Problem List Follow-up Recommendations:  49-year-old male with:    Pneumonia of both lower lobes due to Haemophilus influenzae  -improved   -completed antibiotic course inpatient  -no episodes of respiratory distress reported since admission to SNF  -continue prednisone wean dose  -continue nebulizer treatments p r n  at home  -continues with cough; continue benzonatate 100 mg PO capsules p r n  For cough and Mucinex ER 1200 mg p o  B i d   Until seen by PCP    Acute on chronic respiratory failure with hypoxia  -continuous oxygen via nasal cannula with goal O2 sat>90%  -patient has oxygen at home  -follow up with Pulmonary    Status post aortic valve replacement with metallic valve  -follow up with Cardiology  -continue Lovenox subcu q 12 hours until INR >=2 0  -patient may need to continue Lovenox at home until INR becomes therapeutic    Long-term use of anticoagulants  -INR goal 2 5-3 5  -INR remains subtherapeutic at 1 5 today  -order Coumadin 15 mg today and tomorrow  -recheck PT/INR on 12/22 for dosing  -no s/s of bleeding    PARDEEP  -in setting of above and now resolved  -baseline creatinine 0 9-1 0  -avoid hypotension  -BMP on 12/20/2019 within normal limits for patient with creatinine level of 0 75 GFR 89 BUN 13 sodium and potassium level within normal limits  -script provided for BMP in 1 week as follow-up  -follow up with PCP    IPF (idiopathic pulmonary fibrosis)  -continuous oxygen via nasal cannula  -continue esbriet 101 mg p o  T i d  with food  -follow up with Pulmonary scheduled February 2020    Paroxysmal atrial fibrillation  -heart rate controlled <100  -continue metoprolol for rate control  -coumadin management as per Cardiology    Physical deconditioning/ambulatory dysfunction  -continue PT/OT at home with [facility]    I have spent >30 minutes with patient and wife today in which greater than 50% of this time was spent in counseling/coordination of care regarding Intructions for management, Patient and family education and Importance of tx compliance  PCP Dr Amena Paniagua made aware of above discharge and of acute kidney injury diagnosis from hospital     **Please note: This discharge summary was constructed using a voice recognition system  [address] [place] [facility]  07/24/86893:05 PM

## 2020-04-03 ENCOUNTER — TELEPHONE (OUTPATIENT)
Dept: UROLOGY | Facility: MEDICAL CENTER | Age: 75
End: 2020-04-03

## 2020-04-06 ENCOUNTER — TELEMEDICINE (OUTPATIENT)
Dept: UROLOGY | Facility: MEDICAL CENTER | Age: 75
End: 2020-04-06
Payer: MEDICARE

## 2020-04-06 VITALS — HEIGHT: 73 IN | BODY MASS INDEX: 31.14 KG/M2 | WEIGHT: 235 LBS

## 2020-04-06 DIAGNOSIS — R31.29 OTHER MICROSCOPIC HEMATURIA: Primary | ICD-10-CM

## 2020-04-06 DIAGNOSIS — N13.8 BPH WITH OBSTRUCTION/LOWER URINARY TRACT SYMPTOMS: ICD-10-CM

## 2020-04-06 DIAGNOSIS — N40.1 BPH WITH OBSTRUCTION/LOWER URINARY TRACT SYMPTOMS: ICD-10-CM

## 2020-04-06 PROCEDURE — 99214 OFFICE O/P EST MOD 30 MIN: CPT | Performed by: UROLOGY

## 2020-04-06 RX ORDER — GUAIFENESIN 600 MG
1200 TABLET, EXTENDED RELEASE 12 HR ORAL EVERY 12 HOURS SCHEDULED
COMMUNITY

## 2020-04-06 RX ORDER — FUROSEMIDE 20 MG/1
20 TABLET ORAL 2 TIMES DAILY
COMMUNITY

## 2020-04-06 RX ORDER — BENZONATATE 100 MG/1
100 CAPSULE ORAL 3 TIMES DAILY PRN
COMMUNITY